# Patient Record
Sex: FEMALE | Race: AMERICAN INDIAN OR ALASKA NATIVE
[De-identification: names, ages, dates, MRNs, and addresses within clinical notes are randomized per-mention and may not be internally consistent; named-entity substitution may affect disease eponyms.]

---

## 2018-05-15 ENCOUNTER — HOSPITAL ENCOUNTER (OUTPATIENT)
Dept: HOSPITAL 5 - MAMMO | Age: 23
Discharge: HOME | End: 2018-05-15
Attending: NURSE PRACTITIONER
Payer: COMMERCIAL

## 2018-05-15 DIAGNOSIS — D24.1: Primary | ICD-10-CM

## 2018-05-16 NOTE — MAMMOGRAPHY REPORT
RIGHT DIGITAL DIAGNOSTIC MAMMOGRAM with CAD and RIGHT BREAST 

ULTRASOUND: 05/15/18 12:21:00



CLINICAL: 23-year-old with a right breast lump.  She had a right breast 

ultrasound 05/14/15 which revealed probable benign fibroadenomas at 1 

o'clock and 6 o'clock.  However she did not return for followup as 

recommended.



COMPARISON:05/14/15 right breast ultrasound.



FINDINGS: The breast is extremely dense, which may obscure small masses 

and limit the sensitivity of mammography.  An oval circumscribed mass 

with a gentle lobulations measures 3.9 cm and correlates with the 

previously described mass at 6 o'clock.  A second partially 

circumscribed with lobulations is identified at 2 o'clock.  No 

architectural distortion or suspicious calcifications.   



Ultrasound of the right breast (including all four quadrants and the 

retroareolar area) was performed.  An oval solid heterogeneous 

hypoechoic grossly smooth mass at 1 o'clock at the areola measures 3.4 

x 1.1 x 3.0 cm and correlates with the previously described solid mass 

measuring 3.1 x 1.4 x 3.0 cm.  A similar solid oval heterogeneous 

hypoechoic mass at 7 o'clock 6 cm from the nipple measures 3.3 x 1.5 x 

2.9 cm and correlates with the previously described mass at 6 o'clock 

measuring 3.4 x 2.3 cm.  A third solid oval heterogeneous hypoechoic 

mass is identified at 4 o'clock 4 cm from the nipple and it measures 

0.6 x 0.5 x 0.3 cm.  No other solid mass and no cyst or suspicious 

shadowing.



IMPRESSION: 1.  Stable benign fibroadenomas at 1 o'clock and at 6 to 7 

o'clock without significant change compared to the 2015 exam.  2.  A 

probably benign 6 mm solid mass at 4 o'clock 4 cm from the nipple.  

Recommend six month followup ultrasound to reevaluate this mass.



 BI-RADS CATEGORY:  3 - - Probably Benign





ACR BI-RADS MAMMOGRAPHIC CODES:

0 = Needs additional imaging evaluation; 1 = Negative; 2 = Benign; 3 = 

Probably benign; 4 = Suspicious; 5 = Malignant; 6 = Known biopsy-proven 

malignancy



COMMENT:

      1.   Dense breast tissue, i.e., adenosis, fibrocystic 

            changes, etc., may obscure an underlying neoplasm.

      2.   Approximately 10% of cancers are not detected with

            mammography.

      3.   A negative mammography report should not delay biopsy 

            if a clinically suspicious mass is present.





COMMENT:

Patient follow-up letters are generated by our [employer] application.

## 2018-05-16 NOTE — ULTRASOUND REPORT
RIGHT DIGITAL DIAGNOSTIC MAMMOGRAM with CAD and RIGHT BREAST 

ULTRASOUND: 05/15/18 12:21:00



CLINICAL: 23-year-old with a right breast lump.  She had a right breast 

ultrasound 05/14/15 which revealed probable benign fibroadenomas at 1 

o'clock and 6 o'clock.  However she did not return for followup as 

recommended.



COMPARISON:05/14/15 right breast ultrasound.



FINDINGS: The breast is extremely dense, which may obscure small masses 

and limit the sensitivity of mammography.  An oval circumscribed mass 

with a gentle lobulations measures 3.9 cm and correlates with the 

previously described mass at 6 o'clock.  A second partially 

circumscribed with lobulations is identified at 2 o'clock.  No 

architectural distortion or suspicious calcifications.   



Ultrasound of the right breast (including all four quadrants and the 

retroareolar area) was performed.  An oval solid heterogeneous 

hypoechoic grossly smooth mass at 1 o'clock at the areola measures 3.4 

x 1.1 x 3.0 cm and correlates with the previously described solid mass 

measuring 3.1 x 1.4 x 3.0 cm.  A similar solid oval heterogeneous 

hypoechoic mass at 7 o'clock 6 cm from the nipple measures 3.3 x 1.5 x 

2.9 cm and correlates with the previously described mass at 6 o'clock 

measuring 3.4 x 2.3 cm.  A third solid oval heterogeneous hypoechoic 

mass is identified at 4 o'clock 4 cm from the nipple and it measures 

0.6 x 0.5 x 0.3 cm.  No other solid mass and no cyst or suspicious 

shadowing.



IMPRESSION: 1.  Stable benign fibroadenomas at 1 o'clock and at 6 to 7 

o'clock without significant change compared to the 2015 exam.  2.  A 

probably benign 6 mm solid mass at 4 o'clock 4 cm from the nipple.  

Recommend six month followup ultrasound to reevaluate this mass.



 BI-RADS CATEGORY:  3 - - Probably Benign





ACR BI-RADS MAMMOGRAPHIC CODES:

0 = Needs additional imaging evaluation; 1 = Negative; 2 = Benign; 3 = 

Probably benign; 4 = Suspicious; 5 = Malignant; 6 = Known biopsy-proven 

malignancy



COMMENT:

      1.   Dense breast tissue, i.e., adenosis, fibrocystic 

            changes, etc., may obscure an underlying neoplasm.

      2.   Approximately 10% of cancers are not detected with

            mammography.

      3.   A negative mammography report should not delay biopsy 

            if a clinically suspicious mass is present.





COMMENT:

Patient follow-up letters are generated by our [employer] application.